# Patient Record
Sex: FEMALE | Race: WHITE | NOT HISPANIC OR LATINO | URBAN - METROPOLITAN AREA
[De-identification: names, ages, dates, MRNs, and addresses within clinical notes are randomized per-mention and may not be internally consistent; named-entity substitution may affect disease eponyms.]

---

## 2020-07-25 ENCOUNTER — TELEPHONE ENCOUNTER (OUTPATIENT)
Dept: URBAN - METROPOLITAN AREA CLINIC 13 | Facility: CLINIC | Age: 60
End: 2020-07-25

## 2020-07-25 RX ORDER — POLYETHYLENE GLYCOL 3350, SODIUM SULFATE, SODIUM CHLORIDE, POTASSIUM CHLORIDE, ASCORBIC ACID, SODIUM ASCORBATE 7.5-2.691G
DRINK 1 LITER OF SOLUTION AT 5:00 PM THE DAY BEFORE PROCEDURE; DRINK 1 LITER OF SOLUTION 6 HOURS PRIOR TO PROCEDURE KIT ORAL
Qty: 2000 | Refills: 0 | OUTPATIENT
Start: 2019-03-04 | End: 2019-03-12

## 2020-07-25 RX ORDER — POLYETHYLENE GLYCOL 3350, SODIUM SULFATE, SODIUM CHLORIDE, POTASSIUM CHLORIDE, ASCORBIC ACID, SODIUM ASCORBATE 7.5-2.691G
TAKE 32 OZ AS DIRECTED 5:00PM THE EVENING BEFORE AND 6HR PRIOR TO PROCEDURE KIT ORAL
Qty: 1 | Refills: 0 | OUTPATIENT
Start: 2016-12-05 | End: 2017-01-17

## 2020-07-25 RX ORDER — CETIRIZINE HYDROCHLORIDE 10 MG/1
TAKE 1 TABLET DAILY AS NEEDED TABLET, FILM COATED ORAL
Refills: 0 | OUTPATIENT
End: 2016-12-05

## 2020-07-25 RX ORDER — POLYETHYLENE GLYCOL 3350, SODIUM SULFATE, SODIUM CHLORIDE, POTASSIUM CHLORIDE, ASCORBIC ACID, SODIUM ASCORBATE 7.5-2.691G
USE AS DIRECTED KIT ORAL
Qty: 1 | Refills: 0 | OUTPATIENT
Start: 2013-11-06 | End: 2013-12-18

## 2020-07-26 ENCOUNTER — TELEPHONE ENCOUNTER (OUTPATIENT)
Dept: URBAN - METROPOLITAN AREA CLINIC 13 | Facility: CLINIC | Age: 60
End: 2020-07-26

## 2020-07-26 RX ORDER — FLUTICASONE PROPIONATE 50 UG/1
SPRAY, METERED NASAL
Qty: 16 | Refills: 0 | Status: ACTIVE | COMMUNITY
Start: 2012-08-28

## 2020-07-26 RX ORDER — UBIDECARENONE/VIT E ACET 100MG-5
TAKE 4 CAPSULE CAPSULE ORAL
Refills: 0 | Status: ACTIVE | COMMUNITY
Start: 2013-11-06

## 2020-07-26 RX ORDER — MONTELUKAST SODIUM 10 MG/1
TAKE 1 TABLET DAILY TABLET, FILM COATED ORAL
Refills: 0 | Status: ACTIVE | COMMUNITY
Start: 2016-12-05

## 2020-07-26 RX ORDER — METFORMIN HYDROCHLORIDE 1000 MG/1
TABLET, COATED ORAL
Qty: 180 | Refills: 0 | Status: ACTIVE | COMMUNITY
Start: 2019-01-05

## 2020-07-26 RX ORDER — METFORMIN HYDROCHLORIDE 1000 MG/1
TABLET, COATED ORAL
Qty: 180 | Refills: 0 | Status: ACTIVE | COMMUNITY
Start: 2018-06-19

## 2020-07-26 RX ORDER — ATORVASTATIN CALCIUM 20 MG/1
TAKE 1 TABLET BY MOUTH EVERY DAY TABLET, FILM COATED ORAL
Qty: 30 | Refills: 0 | Status: ACTIVE | COMMUNITY
Start: 2012-08-28

## 2020-07-26 RX ORDER — METHENAMINE, SODIUM PHOSPHATE, MONOBASIC, METHYLENE BLUE, AND HYOSCYAMINE SULFATE 81.6; 40.8; 10.8; .12 MG/1; MG/1; MG/1; MG/1
TAKE 1 TABLET BY MOUTH 4 TIMES A DAY AS NEEDED FOR URINARY DISCOMFORT TABLET, COATED ORAL
Qty: 8 | Refills: 0 | Status: ACTIVE | COMMUNITY
Start: 2012-05-27

## 2020-07-26 RX ORDER — LISINOPRIL 20 MG/1
TABLET ORAL
Qty: 30 | Refills: 0 | Status: ACTIVE | COMMUNITY
Start: 2012-05-14

## 2022-06-30 ENCOUNTER — TELEPHONE ENCOUNTER (OUTPATIENT)
Dept: URBAN - METROPOLITAN AREA CLINIC 6 | Facility: CLINIC | Age: 62
End: 2022-06-30

## 2022-07-26 ENCOUNTER — OFFICE VISIT (OUTPATIENT)
Dept: URBAN - METROPOLITAN AREA CLINIC 113 | Facility: CLINIC | Age: 62
End: 2022-07-26
Payer: COMMERCIAL

## 2022-07-26 ENCOUNTER — WEB ENCOUNTER (OUTPATIENT)
Dept: URBAN - METROPOLITAN AREA CLINIC 113 | Facility: CLINIC | Age: 62
End: 2022-07-26

## 2022-07-26 VITALS
SYSTOLIC BLOOD PRESSURE: 142 MMHG | WEIGHT: 238 LBS | RESPIRATION RATE: 16 BRPM | HEIGHT: 63 IN | DIASTOLIC BLOOD PRESSURE: 78 MMHG | BODY MASS INDEX: 42.17 KG/M2 | TEMPERATURE: 98.3 F | HEART RATE: 78 BPM

## 2022-07-26 DIAGNOSIS — Z86.010 HISTORY OF COLON POLYPS: ICD-10-CM

## 2022-07-26 DIAGNOSIS — Z80.0 FAMILY HISTORY OF COLON CANCER: ICD-10-CM

## 2022-07-26 PROCEDURE — 99203 OFFICE O/P NEW LOW 30 MIN: CPT

## 2022-07-26 RX ORDER — LISINOPRIL 20 MG/1
TABLET ORAL
Qty: 30 | Refills: 0 | Status: ACTIVE | COMMUNITY
Start: 2012-05-14

## 2022-07-26 RX ORDER — SODIUM SULFATE, MAGNESIUM SULFATE, AND POTASSIUM CHLORIDE 17.75; 2.7; 2.25 G/1; G/1; G/1
12 TABLETS TABLET ORAL
Qty: 24 TABLETS | Refills: 0 | OUTPATIENT
Start: 2022-07-26 | End: 2022-07-27

## 2022-07-26 RX ORDER — METHENAMINE, SODIUM PHOSPHATE, MONOBASIC, METHYLENE BLUE, AND HYOSCYAMINE SULFATE 81.6; 40.8; 10.8; .12 MG/1; MG/1; MG/1; MG/1
TAKE 1 TABLET BY MOUTH 4 TIMES A DAY AS NEEDED FOR URINARY DISCOMFORT TABLET, COATED ORAL
Qty: 8 | Refills: 0 | Status: ON HOLD | COMMUNITY
Start: 2012-05-27

## 2022-07-26 RX ORDER — ATORVASTATIN CALCIUM 20 MG/1
TAKE 1 TABLET BY MOUTH EVERY DAY TABLET, FILM COATED ORAL
Qty: 30 | Refills: 0 | Status: ACTIVE | COMMUNITY
Start: 2012-08-28

## 2022-07-26 RX ORDER — FLUTICASONE PROPIONATE 50 UG/1
SPRAY, METERED NASAL
Qty: 16 | Refills: 0 | Status: ON HOLD | COMMUNITY
Start: 2012-08-28

## 2022-07-26 RX ORDER — UBIDECARENONE/VIT E ACET 100MG-5
TAKE 4 CAPSULE CAPSULE ORAL
Refills: 0 | Status: ACTIVE | COMMUNITY
Start: 2013-11-06

## 2022-07-26 RX ORDER — MONTELUKAST SODIUM 10 MG/1
TAKE 1 TABLET DAILY TABLET, FILM COATED ORAL
Refills: 0 | Status: ACTIVE | COMMUNITY
Start: 2016-12-05

## 2022-07-26 RX ORDER — METFORMIN HYDROCHLORIDE 1000 MG/1
TABLET, COATED ORAL
Qty: 180 | Refills: 0 | Status: ACTIVE | COMMUNITY
Start: 2018-06-19

## 2022-07-26 NOTE — HPI-OTHER HISTORIES
Colonoscopy 12/18/2013:Good prep, 2 (5 to 6 mm) tubular adenomatous sessile polyps in proximal descending colon, otherwise unremarkable.  Labs 1/20/2022:Elevated glucose 114, elevated alkaline phosphatase 163, normal total bilirubin, normal ALT/AST, normal CBC, elevated hemoglobin A1c 6.6, normal TSH.

## 2022-07-26 NOTE — HPI-TODAY'S VISIT:
62-year-old female with a history of hypothyroidism, type 2 diabetes, hyperlipidemia, hypertension, peripheral venous insufficiency, cardiac murmur and a family history of colon cancer presents for colonoscopy consultation.    She has been followed by Dr. Genao since 2013 for screening colonoscopies. She does have a family history of colon cancer diagnosed in sister at age 50 and colon cancer in father diagnosed at age 67.  Her sister reportedly had genetic testing which was negative for Toussaint syndrome.  Last colonoscopy in 2019 was notable for 2 tubular adenomas.  She was recommended a repeat colonoscopy in March 2022.  Colonoscopy 3/12/2019:Performed with difficulty due to significant looping and a tortuous colon.  BB PS score of 9.  Ileum was normal.  Removal of 2 (2 to 3 mm) polyps in the proximal transverse colon and cecum.  Pathology revealed tubular adenomas.  Repeat colonoscopy in 3 years for surveillance.  Patient has been doing very well from a GI standpoint. She denies heartburn, dysphagia, nausea or vomiting. Bowel movements are regular and without blood per rectum. She denies any new surgeries or hospitalizations.

## 2022-08-31 ENCOUNTER — OFFICE VISIT (OUTPATIENT)
Dept: URBAN - METROPOLITAN AREA SURGERY CENTER 25 | Facility: SURGERY CENTER | Age: 62
End: 2022-08-31
Payer: COMMERCIAL

## 2022-08-31 DIAGNOSIS — K63.5 INFLAMMATORY COLON POLYP: ICD-10-CM

## 2022-08-31 DIAGNOSIS — Z80.0 FAMILY HISTORY OF COLON CANCER: ICD-10-CM

## 2022-08-31 DIAGNOSIS — Z86.010 HISTORY OF COLON POLYPS: ICD-10-CM

## 2022-08-31 PROCEDURE — 45385 COLONOSCOPY W/LESION REMOVAL: CPT | Performed by: INTERNAL MEDICINE

## 2022-08-31 PROCEDURE — G8907 PT DOC NO EVENTS ON DISCHARG: HCPCS | Performed by: INTERNAL MEDICINE

## 2022-08-31 RX ORDER — LISINOPRIL 20 MG/1
TABLET ORAL
Qty: 30 | Refills: 0 | Status: ACTIVE | COMMUNITY
Start: 2012-05-14

## 2022-08-31 RX ORDER — METHENAMINE, SODIUM PHOSPHATE, MONOBASIC, METHYLENE BLUE, AND HYOSCYAMINE SULFATE 81.6; 40.8; 10.8; .12 MG/1; MG/1; MG/1; MG/1
TAKE 1 TABLET BY MOUTH 4 TIMES A DAY AS NEEDED FOR URINARY DISCOMFORT TABLET, COATED ORAL
Qty: 8 | Refills: 0 | Status: ON HOLD | COMMUNITY
Start: 2012-05-27

## 2022-08-31 RX ORDER — ATORVASTATIN CALCIUM 20 MG/1
TAKE 1 TABLET BY MOUTH EVERY DAY TABLET, FILM COATED ORAL
Qty: 30 | Refills: 0 | Status: ACTIVE | COMMUNITY
Start: 2012-08-28

## 2022-08-31 RX ORDER — METFORMIN HYDROCHLORIDE 1000 MG/1
TABLET, COATED ORAL
Qty: 180 | Refills: 0 | Status: ACTIVE | COMMUNITY
Start: 2018-06-19

## 2022-08-31 RX ORDER — MONTELUKAST SODIUM 10 MG/1
TAKE 1 TABLET DAILY TABLET, FILM COATED ORAL
Refills: 0 | Status: ACTIVE | COMMUNITY
Start: 2016-12-05

## 2022-08-31 RX ORDER — FLUTICASONE PROPIONATE 50 UG/1
SPRAY, METERED NASAL
Qty: 16 | Refills: 0 | Status: ON HOLD | COMMUNITY
Start: 2012-08-28

## 2022-08-31 RX ORDER — UBIDECARENONE/VIT E ACET 100MG-5
TAKE 4 CAPSULE CAPSULE ORAL
Refills: 0 | Status: ACTIVE | COMMUNITY
Start: 2013-11-06

## 2022-09-09 PROBLEM — 428283002: Status: ACTIVE | Noted: 2022-07-26

## 2023-04-05 ENCOUNTER — OFFICE VISIT (OUTPATIENT)
Dept: URBAN - METROPOLITAN AREA CLINIC 113 | Facility: CLINIC | Age: 63
End: 2023-04-05
Payer: COMMERCIAL

## 2023-04-05 ENCOUNTER — WEB ENCOUNTER (OUTPATIENT)
Dept: URBAN - METROPOLITAN AREA CLINIC 113 | Facility: CLINIC | Age: 63
End: 2023-04-05

## 2023-04-05 VITALS
RESPIRATION RATE: 18 BRPM | SYSTOLIC BLOOD PRESSURE: 146 MMHG | DIASTOLIC BLOOD PRESSURE: 82 MMHG | BODY MASS INDEX: 41.11 KG/M2 | TEMPERATURE: 97.5 F | WEIGHT: 232 LBS | HEIGHT: 63 IN | HEART RATE: 95 BPM

## 2023-04-05 DIAGNOSIS — R74.8 ELEVATED LIVER ENZYMES: ICD-10-CM

## 2023-04-05 DIAGNOSIS — Z80.0 FAMILY HISTORY OF COLON CANCER: ICD-10-CM

## 2023-04-05 DIAGNOSIS — Z86.010 HISTORY OF COLON POLYPS: ICD-10-CM

## 2023-04-05 PROCEDURE — 99204 OFFICE O/P NEW MOD 45 MIN: CPT | Performed by: INTERNAL MEDICINE

## 2023-04-05 PROCEDURE — 99244 OFF/OP CNSLTJ NEW/EST MOD 40: CPT | Performed by: INTERNAL MEDICINE

## 2023-04-05 RX ORDER — METHENAMINE, SODIUM PHOSPHATE, MONOBASIC, METHYLENE BLUE, AND HYOSCYAMINE SULFATE 81.6; 40.8; 10.8; .12 MG/1; MG/1; MG/1; MG/1
TAKE 1 TABLET BY MOUTH 4 TIMES A DAY AS NEEDED FOR URINARY DISCOMFORT TABLET, COATED ORAL
Qty: 8 | Refills: 0 | Status: ON HOLD | COMMUNITY
Start: 2012-05-27

## 2023-04-05 RX ORDER — MONTELUKAST SODIUM 10 MG/1
TAKE 1 TABLET DAILY TABLET, FILM COATED ORAL
Refills: 0 | Status: ACTIVE | COMMUNITY
Start: 2016-12-05

## 2023-04-05 RX ORDER — UBIDECARENONE/VIT E ACET 100MG-5
TAKE 4 CAPSULE CAPSULE ORAL
Refills: 0 | Status: ON HOLD | COMMUNITY
Start: 2013-11-06

## 2023-04-05 RX ORDER — LEVOCETIRIZINE DIHYDROCHLORIDE 5 MG/1
1 TABLET IN THE EVENING TABLET, FILM COATED ORAL ONCE A DAY
Status: ACTIVE | COMMUNITY

## 2023-04-05 RX ORDER — ORAL SEMAGLUTIDE 7 MG/1
1 TABLET AT LEAST 30 MINUTES BEFORE FIRST FOOD, BEVERAGE OR OTHER ORAL MEDICINE OF THE DAY TABLET ORAL ONCE A DAY
Status: ACTIVE | COMMUNITY

## 2023-04-05 RX ORDER — LEVOTHYROXINE SODIUM 75 UG/1
1 TABLET IN THE MORNING ON AN EMPTY STOMACH TABLET ORAL ONCE A DAY
Status: ACTIVE | COMMUNITY

## 2023-04-05 RX ORDER — FLUTICASONE PROPIONATE 50 UG/1
SPRAY, METERED NASAL
Qty: 16 | Refills: 0 | Status: ON HOLD | COMMUNITY
Start: 2012-08-28

## 2023-04-05 RX ORDER — LISINOPRIL 20 MG/1
TABLET ORAL
Qty: 30 | Refills: 0 | Status: ON HOLD | COMMUNITY
Start: 2012-05-14

## 2023-04-05 RX ORDER — ATORVASTATIN CALCIUM 20 MG/1
TAKE 1 TABLET BY MOUTH EVERY DAY TABLET, FILM COATED ORAL
Qty: 30 | Refills: 0 | Status: ON HOLD | COMMUNITY
Start: 2012-08-28

## 2023-04-05 RX ORDER — METFORMIN HYDROCHLORIDE 1000 MG/1
TABLET, COATED ORAL
Qty: 180 | Refills: 0 | Status: ACTIVE | COMMUNITY
Start: 2018-06-19

## 2023-04-05 NOTE — HPI-TODAY'S VISIT:
62-year-old female with a history of hypothyroidism, type 2 diabetes, hyperlipidemia, hypertension, peripheral venous insufficiency, cardiac murmur and a family history of colon cancer presents for colonoscopy consultation.    She has been followed by Dr. Genao since 2013 for screening colonoscopies. She does have a family history of colon cancer diagnosed in sister at age 50 and colon cancer in father diagnosed at age 67.  Her sister reportedly had genetic testing which was negative for Toussaint syndrome.  Last colonoscopy in 2019 was notable for 2 tubular adenomas.  She was recommended a repeat colonoscopy in March 2022.  Patient has been doing very well from a GI standpoint. She denies heartburn, dysphagia, nausea or vomiting. Bowel movements are regular and without blood per rectum. She denies any new surgeries or hospitalizations.  Interval history, 4/5/2023: 62-year-old female is referred back by Dr Annie Lion for abnormal liver enzymes.  A copy of today's visit will be forwarded to the referring provider.  She was last seen on 7/26/2022.  She was due for surveillance colonoscopy due to personal history of adenomatous colon polyps and family history of colon cancer. Labs 1/31/2023:Glucose 140, alkaline phosphatase 173, normal AST, normal ALT, normal total bilirubin, normal lipid panel, hemoglobin A1c 6.9, normal TSH. Colonoscopy 8/31/2022:Performed with difficulty due to significant looping.  BBPS score of 9.  TI was normal.  Removal of a 4 mm inflammatory polyp, negative for adenoma or malignancy in the distal transverse colon.  Diverticulosis in the sigmoid colon.  Pressure was required to get into the cecal base.  Repeat colonoscopy in 5 years for surveillance. Abdominal ultrasound 2/28/2023:No acute findings.  No gallstones.  Increased liver echogenicity that may reflect any combination of hepatic steatosis/or chronic hepatocellular disease.  1.1 cm nonobstructing right renal calculus.  She denies any personal or family history of liver disease. She denies ETOH use. She has been on a statin medication for 5 years without changes. Denies nausea, vomiting, abdominal pain, yellowing of eyes or skin. Bowel movements are regular and without blood. She is otherwise doing well.

## 2023-04-05 NOTE — HPI-OTHER HISTORIES
Colonoscopy 3/12/2019:Performed with difficulty due to significant looping and a tortuous colon.  BB PS score of 9.  Ileum was normal.  Removal of 2 (2 to 3 mm) polyps in the proximal transverse colon and cecum.  Pathology revealed tubular adenomas.  Repeat colonoscopy in 3 years for surveillance.  Colonoscopy 12/18/2013:Good prep, 2 (5 to 6 mm) tubular adenomatous sessile polyps in proximal descending colon, otherwise unremarkable.  Labs 1/20/2022:Elevated glucose 114, elevated alkaline phosphatase 163, normal total bilirubin, normal ALT/AST, normal CBC, elevated hemoglobin A1c 6.6, normal TSH.

## 2023-04-11 LAB
A/G RATIO: 1.7
ACTIN (SMOOTH MUSCLE) ANTIBODY (IGG): <20
ALBUMIN: 4.2
ALKALINE PHOSPHATASE: 139
ALT (SGPT): 10
AST (SGOT): 11
BILIRUBIN, TOTAL: 0.3
BUN/CREATININE RATIO: (no result)
BUN: 10
CALCIUM: 9.6
CARBON DIOXIDE, TOTAL: 30
CHLORIDE: 103
CREATININE: 0.5
EGFR: 106
FERRITIN, SERUM: 32
GGT: 9
GLOBULIN, TOTAL: 2.5
GLUCOSE: 110
HEPATITIS B CORE AB TOTAL: (no result)
HEPATITIS B SURFACE AB IMMUNITY, QN: <5
HEPATITIS B SURFACE ANTIGEN: (no result)
HEPATITIS C ANTIBODY: (no result)
INDEX: <0.02
IRON BIND.CAP.(TIBC): 295
IRON SATURATION: 14
IRON: 40
MITOCHONDRIAL (M2) ANTIBODY: <=20
POTASSIUM: 4
PROTEIN, TOTAL: 6.7
SODIUM: 142

## 2023-07-11 ENCOUNTER — OFFICE VISIT (OUTPATIENT)
Dept: URBAN - METROPOLITAN AREA CLINIC 113 | Facility: CLINIC | Age: 63
End: 2023-07-11

## 2023-08-15 ENCOUNTER — OFFICE VISIT (OUTPATIENT)
Dept: URBAN - METROPOLITAN AREA CLINIC 113 | Facility: CLINIC | Age: 63
End: 2023-08-15

## 2023-09-26 ENCOUNTER — OFFICE VISIT (OUTPATIENT)
Dept: URBAN - METROPOLITAN AREA CLINIC 113 | Facility: CLINIC | Age: 63
End: 2023-09-26

## 2023-10-24 ENCOUNTER — DASHBOARD ENCOUNTERS (OUTPATIENT)
Age: 63
End: 2023-10-24

## 2023-10-24 ENCOUNTER — OFFICE VISIT (OUTPATIENT)
Dept: URBAN - METROPOLITAN AREA CLINIC 113 | Facility: CLINIC | Age: 63
End: 2023-10-24
Payer: COMMERCIAL

## 2023-10-24 VITALS
TEMPERATURE: 97.9 F | HEIGHT: 63 IN | HEART RATE: 78 BPM | RESPIRATION RATE: 14 BRPM | BODY MASS INDEX: 38.45 KG/M2 | DIASTOLIC BLOOD PRESSURE: 78 MMHG | SYSTOLIC BLOOD PRESSURE: 133 MMHG | WEIGHT: 217 LBS

## 2023-10-24 DIAGNOSIS — R74.8 ELEVATED LIVER ENZYMES: ICD-10-CM

## 2023-10-24 DIAGNOSIS — Z80.0 FAMILY HISTORY OF COLON CANCER: ICD-10-CM

## 2023-10-24 DIAGNOSIS — Z86.010 HISTORY OF COLON POLYPS: ICD-10-CM

## 2023-10-24 PROCEDURE — 99213 OFFICE O/P EST LOW 20 MIN: CPT

## 2023-10-24 RX ORDER — ORAL SEMAGLUTIDE 7 MG/1
1 TABLET AT LEAST 30 MINUTES BEFORE FIRST FOOD, BEVERAGE OR OTHER ORAL MEDICINE OF THE DAY TABLET ORAL ONCE A DAY
Status: ACTIVE | COMMUNITY

## 2023-10-24 RX ORDER — UBIDECARENONE/VIT E ACET 100MG-5
TAKE 4 CAPSULE CAPSULE ORAL
Refills: 0 | Status: ON HOLD | COMMUNITY
Start: 2013-11-06

## 2023-10-24 RX ORDER — METFORMIN HYDROCHLORIDE 1000 MG/1
TABLET, COATED ORAL
Qty: 180 | Refills: 0 | Status: ACTIVE | COMMUNITY
Start: 2018-06-19

## 2023-10-24 RX ORDER — LEVOCETIRIZINE DIHYDROCHLORIDE 5 MG/1
1 TABLET IN THE EVENING TABLET, FILM COATED ORAL ONCE A DAY
Status: ACTIVE | COMMUNITY

## 2023-10-24 RX ORDER — UBIDECARENONE/VIT E ACET 100MG-5
AS DIRECTED CAPSULE ORAL
Status: ACTIVE | COMMUNITY

## 2023-10-24 RX ORDER — LISINOPRIL 20 MG/1
TABLET ORAL
Qty: 30 | Refills: 0 | Status: ON HOLD | COMMUNITY
Start: 2012-05-14

## 2023-10-24 RX ORDER — METHENAMINE, SODIUM PHOSPHATE, MONOBASIC, METHYLENE BLUE, AND HYOSCYAMINE SULFATE 81.6; 40.8; 10.8; .12 MG/1; MG/1; MG/1; MG/1
TAKE 1 TABLET BY MOUTH 4 TIMES A DAY AS NEEDED FOR URINARY DISCOMFORT TABLET, COATED ORAL
Qty: 8 | Refills: 0 | Status: ON HOLD | COMMUNITY
Start: 2012-05-27

## 2023-10-24 RX ORDER — ATORVASTATIN CALCIUM 20 MG/1
TAKE 1 TABLET BY MOUTH EVERY DAY TABLET, FILM COATED ORAL
Qty: 30 | Refills: 0 | Status: ON HOLD | COMMUNITY
Start: 2012-08-28

## 2023-10-24 RX ORDER — FLUTICASONE PROPIONATE 50 UG/1
SPRAY, METERED NASAL
Qty: 16 | Refills: 0 | Status: ON HOLD | COMMUNITY
Start: 2012-08-28

## 2023-10-24 RX ORDER — MONTELUKAST SODIUM 10 MG/1
TAKE 1 TABLET DAILY TABLET, FILM COATED ORAL
Refills: 0 | Status: ACTIVE | COMMUNITY
Start: 2016-12-05

## 2023-10-24 RX ORDER — LEVOTHYROXINE SODIUM 75 UG/1
1 TABLET IN THE MORNING ON AN EMPTY STOMACH TABLET ORAL ONCE A DAY
Status: ACTIVE | COMMUNITY

## 2023-10-24 NOTE — HPI-TODAY'S VISIT:
62-year-old female with a history of hypothyroidism, type 2 diabetes, hyperlipidemia, hypertension, peripheral venous insufficiency, cardiac murmur and a family history of colon cancer presents for colonoscopy consultation.    She has been followed by Dr. Geano since 2013 for screening colonoscopies. She does have a family history of colon cancer diagnosed in sister at age 50 and colon cancer in father diagnosed at age 67.  Her sister reportedly had genetic testing which was negative for Toussaint syndrome.  Last colonoscopy in 2019 was notable for 2 tubular adenomas.  She was recommended a repeat colonoscopy in March 2022.  Patient has been doing very well from a GI standpoint. She denies heartburn, dysphagia, nausea or vomiting. Bowel movements are regular and without blood per rectum. She denies any new surgeries or hospitalizations.  Interval history, 4/5/2023: 62-year-old female is referred back by Dr Annie Lion for abnormal liver enzymes.  A copy of today's visit will be forwarded to the referring provider.  She was last seen on 7/26/2022.  She was due for surveillance colonoscopy due to personal history of adenomatous colon polyps and family history of colon cancer.  Labs 1/31/2023: Glucose 140, alkaline phosphatase 173, normal AST, normal ALT, normal total bilirubin, normal lipid panel, hemoglobin A1c 6.9, normal TSH.  Colonoscopy 8/31/2022: Performed with difficulty due to significant looping.  BBPS score of 9.  TI was normal.  Removal of a 4 mm inflammatory polyp, negative for adenoma or malignancy in the distal transverse colon.  Diverticulosis in the sigmoid colon.  Pressure was required to get into the cecal base.  Repeat colonoscopy in 5 years for surveillance.  Abdominal ultrasound 2/28/2023: No acute findings.  No gallstones.  Increased liver echogenicity that may reflect any combination of hepatic steatosis/or chronic hepatocellular disease.  1.1 cm nonobstructing right renal calculus.  She denies any personal or family history of liver disease. She denies ETOH use. She has been on a statin medication for 5 years without changes. Denies nausea, vomiting, abdominal pain, yellowing of eyes or skin. Bowel movements are regular and without blood. She is otherwise doing well.  Interval history, 10/20/2023: 63-year-old female presents for follow-up.  She was last seen 4/5/2023.  She was planned for additional labs to rule out causes of elevated liver enzymes with plans to calculate fib 4 score.  Labs 4/5/2023: Low iron of 40, low iron saturation 14%, normal GGT, normal ferritin of 32, negative hepatitis B surface antibody, negative ASMA, negative AMA, glucose 110, normal LFTs, negative hep B core antibody total, negative hep B surface antigen, negative hepatitis C antibody.  She was recommended to obtain vaccine for hepatitis B through PCP or health department.  Otherwise studies were negative and her liver enzymes normalized.  Labs 9/26/2023: Alk phos of 162, AST 12, ALT 10, total bilirubin 0.3, normal CBC, normal lipid panel, hemoglobin A1c of 6.2, normal TSH.  Fib-4 score: 0.72 - low risk for fibrosis.  She has no GI complaints at this time. Denies nausea, vomiting, or abdominal pain. Bowel movements are regular and without blood. She had a normal routine

## 2023-11-02 LAB
ALKALINE PHOSPHATASE: 136
BONE ISOENZYMES: 28
INTESTINAL ISOENZYMES: 13
LIVER ISOENZYMES: 59
MACROHEPATIC ISOENZYMES: 0
PLACENTAL ISOENZYMES: 0